# Patient Record
Sex: MALE | Race: OTHER | NOT HISPANIC OR LATINO | Employment: FULL TIME | URBAN - METROPOLITAN AREA
[De-identification: names, ages, dates, MRNs, and addresses within clinical notes are randomized per-mention and may not be internally consistent; named-entity substitution may affect disease eponyms.]

---

## 2017-04-13 ENCOUNTER — HOSPITAL ENCOUNTER (OUTPATIENT)
Dept: LAB | Facility: MEDICAL CENTER | Age: 62
End: 2017-04-13
Attending: NURSE PRACTITIONER
Payer: COMMERCIAL

## 2017-04-13 LAB
25(OH)D3 SERPL-MCNC: 37 NG/ML (ref 30–100)
ALBUMIN SERPL BCP-MCNC: 4.5 G/DL (ref 3.2–4.9)
ALBUMIN/GLOB SERPL: 1.7 G/DL
ALP SERPL-CCNC: 55 U/L (ref 30–99)
ALT SERPL-CCNC: 22 U/L (ref 2–50)
ANION GAP SERPL CALC-SCNC: 7 MMOL/L (ref 0–11.9)
APPEARANCE UR: CLEAR
AST SERPL-CCNC: 20 U/L (ref 12–45)
BILIRUB SERPL-MCNC: 1.1 MG/DL (ref 0.1–1.5)
BILIRUB UR QL STRIP.AUTO: NEGATIVE
BUN SERPL-MCNC: 16 MG/DL (ref 8–22)
CALCIUM SERPL-MCNC: 9.7 MG/DL (ref 8.5–10.5)
CHLORIDE SERPL-SCNC: 102 MMOL/L (ref 96–112)
CHOLEST SERPL-MCNC: 224 MG/DL (ref 100–199)
CO2 SERPL-SCNC: 30 MMOL/L (ref 20–33)
COLOR UR: YELLOW
CREAT SERPL-MCNC: 0.98 MG/DL (ref 0.5–1.4)
GFR SERPL CREATININE-BSD FRML MDRD: >60 ML/MIN/1.73 M 2
GLOBULIN SER CALC-MCNC: 2.6 G/DL (ref 1.9–3.5)
GLUCOSE SERPL-MCNC: 91 MG/DL (ref 65–99)
GLUCOSE UR STRIP.AUTO-MCNC: NEGATIVE MG/DL
HDLC SERPL-MCNC: 49 MG/DL
KETONES UR STRIP.AUTO-MCNC: NEGATIVE MG/DL
LDLC SERPL CALC-MCNC: 128 MG/DL
LEUKOCYTE ESTERASE UR QL STRIP.AUTO: NEGATIVE
MICRO URNS: NORMAL
NITRITE UR QL STRIP.AUTO: NEGATIVE
PH UR STRIP.AUTO: 6 [PH]
POTASSIUM SERPL-SCNC: 4.2 MMOL/L (ref 3.6–5.5)
PROT SERPL-MCNC: 7.1 G/DL (ref 6–8.2)
PROT UR QL STRIP: NEGATIVE MG/DL
PSA SERPL-MCNC: 0.95 NG/ML (ref 0–4)
RBC UR QL AUTO: NEGATIVE
SODIUM SERPL-SCNC: 139 MMOL/L (ref 135–145)
SP GR UR STRIP.AUTO: 1.02
TRIGL SERPL-MCNC: 235 MG/DL (ref 0–149)
TSH SERPL DL<=0.005 MIU/L-ACNC: 2.94 UIU/ML (ref 0.3–3.7)

## 2017-04-13 PROCEDURE — 80053 COMPREHEN METABOLIC PANEL: CPT

## 2017-04-13 PROCEDURE — 36415 COLL VENOUS BLD VENIPUNCTURE: CPT

## 2017-04-13 PROCEDURE — 81003 URINALYSIS AUTO W/O SCOPE: CPT

## 2017-04-13 PROCEDURE — 85025 COMPLETE CBC W/AUTO DIFF WBC: CPT

## 2017-04-13 PROCEDURE — 84153 ASSAY OF PSA TOTAL: CPT

## 2017-04-13 PROCEDURE — 82306 VITAMIN D 25 HYDROXY: CPT

## 2017-04-13 PROCEDURE — 84443 ASSAY THYROID STIM HORMONE: CPT

## 2017-04-13 PROCEDURE — 80061 LIPID PANEL: CPT

## 2017-04-18 ENCOUNTER — HOSPITAL ENCOUNTER (OUTPATIENT)
Dept: LAB | Facility: MEDICAL CENTER | Age: 62
End: 2017-04-18
Attending: NURSE PRACTITIONER
Payer: COMMERCIAL

## 2017-04-18 LAB
BASOPHILS # BLD AUTO: 0 % (ref 0–1.8)
BASOPHILS # BLD: 0 K/UL (ref 0–0.12)
EOSINOPHIL # BLD AUTO: 0.13 K/UL (ref 0–0.51)
EOSINOPHIL NFR BLD: 2.6 % (ref 0–6.9)
ERYTHROCYTE [DISTWIDTH] IN BLOOD BY AUTOMATED COUNT: 44.6 FL (ref 35.9–50)
HCT VFR BLD AUTO: 50 % (ref 42–52)
HGB BLD-MCNC: 16.8 G/DL (ref 14–18)
IMM GRANULOCYTES # BLD AUTO: 0.01 K/UL (ref 0–0.11)
IMM GRANULOCYTES NFR BLD AUTO: 0.2 % (ref 0–0.9)
LYMPHOCYTES # BLD AUTO: 1.98 K/UL (ref 1–4.8)
LYMPHOCYTES NFR BLD: 39.2 % (ref 22–41)
MCH RBC QN AUTO: 30.7 PG (ref 27–33)
MCHC RBC AUTO-ENTMCNC: 33.6 G/DL (ref 33.7–35.3)
MCV RBC AUTO: 91.2 FL (ref 81.4–97.8)
MONOCYTES # BLD AUTO: 0.45 K/UL (ref 0–0.85)
MONOCYTES NFR BLD AUTO: 8.9 % (ref 0–13.4)
NEUTROPHILS # BLD AUTO: 2.48 K/UL (ref 1.82–7.42)
NEUTROPHILS NFR BLD: 49.1 % (ref 44–72)
NRBC # BLD AUTO: 0 K/UL
NRBC BLD AUTO-RTO: 0 /100 WBC
PLATELET # BLD AUTO: 133 K/UL (ref 164–446)
PMV BLD AUTO: 12.7 FL (ref 9–12.9)
RBC # BLD AUTO: 5.48 M/UL (ref 4.7–6.1)
WBC # BLD AUTO: 5.1 K/UL (ref 4.8–10.8)

## 2017-04-18 PROCEDURE — 85025 COMPLETE CBC W/AUTO DIFF WBC: CPT

## 2018-01-16 ENCOUNTER — HOSPITAL ENCOUNTER (OUTPATIENT)
Dept: LAB | Facility: MEDICAL CENTER | Age: 63
End: 2018-01-16
Attending: FAMILY MEDICINE
Payer: COMMERCIAL

## 2018-01-16 LAB
ALBUMIN SERPL BCP-MCNC: 4.5 G/DL (ref 3.2–4.9)
ALBUMIN/GLOB SERPL: 1.7 G/DL
ALP SERPL-CCNC: 55 U/L (ref 30–99)
ALT SERPL-CCNC: 24 U/L (ref 2–50)
ANION GAP SERPL CALC-SCNC: 5 MMOL/L (ref 0–11.9)
AST SERPL-CCNC: 18 U/L (ref 12–45)
BASOPHILS # BLD AUTO: 0 % (ref 0–1.8)
BASOPHILS # BLD: 0 K/UL (ref 0–0.12)
BILIRUB SERPL-MCNC: 0.7 MG/DL (ref 0.1–1.5)
BUN SERPL-MCNC: 18 MG/DL (ref 8–22)
CALCIUM SERPL-MCNC: 9.3 MG/DL (ref 8.5–10.5)
CHLORIDE SERPL-SCNC: 109 MMOL/L (ref 96–112)
CHOLEST SERPL-MCNC: 204 MG/DL (ref 100–199)
CO2 SERPL-SCNC: 28 MMOL/L (ref 20–33)
CREAT SERPL-MCNC: 0.99 MG/DL (ref 0.5–1.4)
EOSINOPHIL # BLD AUTO: 0.18 K/UL (ref 0–0.51)
EOSINOPHIL NFR BLD: 4.1 % (ref 0–6.9)
ERYTHROCYTE [DISTWIDTH] IN BLOOD BY AUTOMATED COUNT: 44 FL (ref 35.9–50)
GLOBULIN SER CALC-MCNC: 2.6 G/DL (ref 1.9–3.5)
GLUCOSE SERPL-MCNC: 94 MG/DL (ref 65–99)
HCT VFR BLD AUTO: 50 % (ref 42–52)
HDLC SERPL-MCNC: 52 MG/DL
HGB BLD-MCNC: 16.9 G/DL (ref 14–18)
IMM GRANULOCYTES # BLD AUTO: 0.01 K/UL (ref 0–0.11)
IMM GRANULOCYTES NFR BLD AUTO: 0.2 % (ref 0–0.9)
LDLC SERPL CALC-MCNC: 120 MG/DL
LYMPHOCYTES # BLD AUTO: 1.57 K/UL (ref 1–4.8)
LYMPHOCYTES NFR BLD: 35.9 % (ref 22–41)
MCH RBC QN AUTO: 31.1 PG (ref 27–33)
MCHC RBC AUTO-ENTMCNC: 33.8 G/DL (ref 33.7–35.3)
MCV RBC AUTO: 92.1 FL (ref 81.4–97.8)
MONOCYTES # BLD AUTO: 0.39 K/UL (ref 0–0.85)
MONOCYTES NFR BLD AUTO: 8.9 % (ref 0–13.4)
NEUTROPHILS # BLD AUTO: 2.22 K/UL (ref 1.82–7.42)
NEUTROPHILS NFR BLD: 50.9 % (ref 44–72)
NRBC # BLD AUTO: 0 K/UL
NRBC BLD-RTO: 0 /100 WBC
PLATELET # BLD AUTO: 115 K/UL (ref 164–446)
PMV BLD AUTO: 12.7 FL (ref 9–12.9)
POTASSIUM SERPL-SCNC: 3.9 MMOL/L (ref 3.6–5.5)
PROT SERPL-MCNC: 7.1 G/DL (ref 6–8.2)
RBC # BLD AUTO: 5.43 M/UL (ref 4.7–6.1)
SODIUM SERPL-SCNC: 142 MMOL/L (ref 135–145)
TRIGL SERPL-MCNC: 160 MG/DL (ref 0–149)
WBC # BLD AUTO: 4.4 K/UL (ref 4.8–10.8)

## 2018-01-16 PROCEDURE — 85025 COMPLETE CBC W/AUTO DIFF WBC: CPT

## 2018-01-16 PROCEDURE — 36415 COLL VENOUS BLD VENIPUNCTURE: CPT

## 2018-01-16 PROCEDURE — 80061 LIPID PANEL: CPT

## 2018-01-16 PROCEDURE — 80053 COMPREHEN METABOLIC PANEL: CPT

## 2018-01-16 PROCEDURE — 84153 ASSAY OF PSA TOTAL: CPT

## 2018-01-16 PROCEDURE — 84443 ASSAY THYROID STIM HORMONE: CPT

## 2018-01-16 PROCEDURE — 82306 VITAMIN D 25 HYDROXY: CPT

## 2018-01-17 LAB
25(OH)D3 SERPL-MCNC: 33 NG/ML (ref 30–100)
PSA SERPL-MCNC: 1.21 NG/ML (ref 0–4)
TSH SERPL DL<=0.005 MIU/L-ACNC: 2.51 UIU/ML (ref 0.38–5.33)

## 2018-01-22 ENCOUNTER — HOSPITAL ENCOUNTER (OUTPATIENT)
Dept: LAB | Facility: MEDICAL CENTER | Age: 63
End: 2018-01-22
Attending: FAMILY MEDICINE
Payer: COMMERCIAL

## 2018-01-22 LAB — ABO GROUP BLD: NORMAL

## 2018-01-22 PROCEDURE — 86900 BLOOD TYPING SEROLOGIC ABO: CPT

## 2018-01-22 PROCEDURE — 36415 COLL VENOUS BLD VENIPUNCTURE: CPT

## 2018-04-29 ENCOUNTER — OFFICE VISIT (OUTPATIENT)
Dept: URGENT CARE | Facility: CLINIC | Age: 63
End: 2018-04-29
Payer: COMMERCIAL

## 2018-04-29 VITALS
WEIGHT: 142 LBS | HEIGHT: 66 IN | TEMPERATURE: 98.1 F | SYSTOLIC BLOOD PRESSURE: 126 MMHG | DIASTOLIC BLOOD PRESSURE: 82 MMHG | BODY MASS INDEX: 22.82 KG/M2 | HEART RATE: 79 BPM | RESPIRATION RATE: 16 BRPM | OXYGEN SATURATION: 96 %

## 2018-04-29 DIAGNOSIS — J00 NASOPHARYNGITIS: ICD-10-CM

## 2018-04-29 LAB
INT CON NEG: NORMAL
INT CON POS: NORMAL
S PYO AG THROAT QL: NEGATIVE

## 2018-04-29 PROCEDURE — 99214 OFFICE O/P EST MOD 30 MIN: CPT | Performed by: PHYSICIAN ASSISTANT

## 2018-04-29 PROCEDURE — 87880 STREP A ASSAY W/OPTIC: CPT | Performed by: PHYSICIAN ASSISTANT

## 2018-04-29 RX ORDER — AZITHROMYCIN 250 MG/1
TABLET, FILM COATED ORAL
Qty: 6 TAB | Refills: 1 | Status: SHIPPED | OUTPATIENT
Start: 2018-04-29 | End: 2019-04-22

## 2018-04-29 RX ORDER — AZITHROMYCIN 250 MG/1
TABLET, FILM COATED ORAL
Qty: 6 TAB | Refills: 1 | Status: SHIPPED | OUTPATIENT
Start: 2018-04-29 | End: 2018-04-29

## 2018-04-29 ASSESSMENT — ENCOUNTER SYMPTOMS
EYES NEGATIVE: 1
RESPIRATORY NEGATIVE: 1
CARDIOVASCULAR NEGATIVE: 1
SORE THROAT: 1
TROUBLE SWALLOWING: 1
CONSTITUTIONAL NEGATIVE: 1
SWOLLEN GLANDS: 1

## 2018-04-29 NOTE — PROGRESS NOTES
"Subjective:      Edgar Swanson is a 63 y.o. male who presents with Nasal Congestion; Pharyngitis; and Sinus Problem            Pharyngitis    This is a new (st, sinus kim) problem. The problem has been unchanged. Neither side of throat is experiencing more pain than the other. There has been no fever. The pain is moderate. Associated symptoms include congestion, swollen glands and trouble swallowing. He has had no exposure to strep. He has tried nothing for the symptoms. The treatment provided no relief.   Sinus Problem   Associated symptoms include congestion, a sore throat and swollen glands.       Review of Systems   Constitutional: Negative.    HENT: Positive for congestion, sore throat and trouble swallowing.    Eyes: Negative.    Respiratory: Negative.    Cardiovascular: Negative.    Skin: Negative.           Objective:     /82   Pulse 79   Temp 36.7 °C (98.1 °F)   Resp 16   Ht 1.676 m (5' 6\")   Wt 64.4 kg (142 lb)   SpO2 96%   BMI 22.92 kg/m²      Physical Exam   Constitutional: He is oriented to person, place, and time. He appears well-developed and well-nourished. No distress.   HENT:   Head: Normocephalic and atraumatic.   +maxill.sinus press.  +phar./tons redn     Eyes: EOM are normal. Pupils are equal, round, and reactive to light.   Neck: Normal range of motion. Neck supple.   Cardiovascular: Normal rate.    Pulmonary/Chest: Effort normal and breath sounds normal. No respiratory distress.   Lymphadenopathy:     He has cervical adenopathy.   Neurological: He is alert and oriented to person, place, and time.   Skin: Skin is warm and dry.   Psychiatric: He has a normal mood and affect. His behavior is normal.   Nursing note and vitals reviewed.    Active Ambulatory Problems     Diagnosis Date Noted   • Hyperlipidemia 03/01/2010   • Anxiety 04/03/2013   • Chronic urticaria 01/19/2014   • Appendicitis with peritoneal abscess 03/17/2015   • Peritonitis (HCC) 03/18/2015   • Hypophosphatemia " 03/19/2015   • Vitiligo 05/05/2015     Resolved Ambulatory Problems     Diagnosis Date Noted   • Retinitis pigmentosa 03/01/2010     Past Medical History:   Diagnosis Date   • Chronic urticaria 1/19/2014   • Retinitis pigmentosa 3/1/2010     Current Outpatient Prescriptions on File Prior to Visit   Medication Sig Dispense Refill   • oxycodone immediate-release (ROXICODONE) 5 MG TABS Take 1 Tab by mouth every 3 hours as needed (Moderate Pain, CPOT 3-5). 60 Tab 0   • Ibuprofen (ADVIL PO) Take  by mouth.     • Cetirizine HCl (ZYRTEC PO) Take  by mouth.       No current facility-administered medications on file prior to visit.      Social History     Social History   • Marital status:      Spouse name: N/A   • Number of children: N/A   • Years of education: N/A     Occupational History   • Not on file.     Social History Main Topics   • Smoking status: Never Smoker   • Smokeless tobacco: Never Used   • Alcohol use 1.0 oz/week     2 Standard drinks or equivalent per week      Comment: 2 glasses wine   • Drug use: No   • Sexual activity: Not on file      Comment: , 1 child, professer economics [employer]     Other Topics Concern   • Not on file     Social History Narrative    , lives with wife.     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         rst ng     Assessment/Plan:      NASOPHARYNGITIS, cough    · Start w/MucinexD; nsaids; [hold rx for abx prn [conditional use] if sx persist/worsen]  ·

## 2019-03-05 ENCOUNTER — TELEPHONE (OUTPATIENT)
Dept: SCHEDULING | Facility: IMAGING CENTER | Age: 64
End: 2019-03-05

## 2019-04-22 ENCOUNTER — OFFICE VISIT (OUTPATIENT)
Dept: INTERNAL MEDICINE | Facility: MEDICAL CENTER | Age: 64
End: 2019-04-22
Payer: COMMERCIAL

## 2019-04-22 VITALS
WEIGHT: 140.6 LBS | BODY MASS INDEX: 22.6 KG/M2 | DIASTOLIC BLOOD PRESSURE: 87 MMHG | HEART RATE: 78 BPM | HEIGHT: 66 IN | TEMPERATURE: 98.4 F | OXYGEN SATURATION: 95 % | SYSTOLIC BLOOD PRESSURE: 144 MMHG

## 2019-04-22 DIAGNOSIS — R21 RASH AND NONSPECIFIC SKIN ERUPTION: ICD-10-CM

## 2019-04-22 DIAGNOSIS — R03.0 ELEVATED BLOOD PRESSURE READING: ICD-10-CM

## 2019-04-22 DIAGNOSIS — L80 VITILIGO: ICD-10-CM

## 2019-04-22 DIAGNOSIS — Z00.00 HEALTHCARE MAINTENANCE: ICD-10-CM

## 2019-04-22 DIAGNOSIS — E78.5 HYPERLIPIDEMIA, UNSPECIFIED HYPERLIPIDEMIA TYPE: ICD-10-CM

## 2019-04-22 PROCEDURE — 99204 OFFICE O/P NEW MOD 45 MIN: CPT | Mod: GC | Performed by: INTERNAL MEDICINE

## 2019-04-22 RX ORDER — ASPIRIN 325 MG
81 TABLET ORAL DAILY
COMMUNITY
End: 2023-12-15

## 2019-04-22 RX ORDER — CHLORAL HYDRATE 500 MG
1000 CAPSULE ORAL
COMMUNITY
End: 2023-12-15

## 2019-04-22 ASSESSMENT — PATIENT HEALTH QUESTIONNAIRE - PHQ9: CLINICAL INTERPRETATION OF PHQ2 SCORE: 0

## 2019-04-22 NOTE — PATIENT INSTRUCTIONS
I recommend the Shingrix vaccine series to prevent shingles.  Please get the vaccines at a local pharmacy

## 2019-04-22 NOTE — PROGRESS NOTES
New Patient to Establish    Reason to establish: New patient to establish    CC: establish care and follow up on HLD, Vitiligo, allergies    HPI:   Mr Swanson is a 65y/o male with PMHx of Vitiligo, HLD, and allergies that came to clinic to establish care.     Vitiligo   Skin rash   Patient has chronic history of vitiligo and he has been seen by Dermatology in the past   Patient also has history of recurrent rash that he described as red spots that are itching. At the time of evaluation he did not had any active rash. He usually takes zyrtec and it improved.  Patient would like to reestablish with a dermatologist.     Hyperlipidemia  Patient has history of high cholesterol, currently only taking omega-3.  Patient was taking statins in the past but he quit, because he wanted a more natural way to decrease his cholesterol. Previous labs done in January 2018 showed cholesterol of 204, triglycerides of 160, HDL 52, and LDL of 120.  His ASCVD score is 14.2%.  Oriented about the risk of not treating high cholesterol, but patient will not like to start statin at this time.     Elevated blood pressure reading  His blood pressure in the clinic was 144/87.  Patient denies any history of hypertension and is currently not taking any medications.  Denies any chest pain, palpitation, headache, vision changes.     Healthcare maintenance  Colonoscopy was done in 2019 and was normal according to patient  Recommend shingles vaccine  Tdap- due in 2025  PSA-after oriented patient about benefits and disadvantages of measuring this level patient agree to have it tested.     Patient Active Problem List    Diagnosis Date Noted   • Peritonitis (HCC) 03/18/2015     Priority: High   • Appendicitis with peritoneal abscess 03/17/2015     Priority: Medium   • Chronic urticaria 01/19/2014     Priority: Medium   • Anxiety 04/03/2013     Priority: Medium   • Hyperlipidemia 03/01/2010     Priority: Medium   • Vitiligo 05/05/2015     Priority: Low   •  Hypophosphatemia 03/19/2015     Priority: Low   • Healthcare maintenance 04/22/2019       Past Medical History:   Diagnosis Date   • Chronic urticaria 1/19/2014   • Hyperlipidemia    • Retinitis pigmentosa 3/1/2010       Current Outpatient Prescriptions   Medication Sig Dispense Refill   • VITAMIN D, CHOLECALCIFEROL, PO Take  by mouth.     • aspirin (ASA) 325 MG Tab Take 325 mg by mouth every 6 hours as needed.     • Omega-3 Fatty Acids (FISH OIL) 1000 MG Cap capsule Take 1,000 mg by mouth 3 times a day, with meals.     • Multiple Vitamins-Minerals (EYE VITAMINS PO) Take  by mouth.     • Cetirizine HCl (ZYRTEC PO) Take  by mouth.     • Ibuprofen (ADVIL PO) Take  by mouth.       No current facility-administered medications for this visit.        Allergies as of 04/22/2019   • (No Known Allergies)       Social History     Social History   • Marital status:      Spouse name: N/A   • Number of children: N/A   • Years of education: N/A     Occupational History   • Not on file.     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     Children: 1 daughter    Work: professor in economics, vice-michaelle, [employer]       Family History   Problem Relation Age of Onset   • Heart Disease Father    • Hyperlipidemia Father        Past Surgical History:   Procedure Laterality Date   • EXPLORATORY LAPAROTOMY  3/17/2015    Performed by Frank Mayer D.O. at SURGERY [facility]   • HEMICOLECTOMY  3/17/2015    Performed by Frank Mayer D.O. at SURGERY [facility]   • ABDOMINAL EXPLORATION     • APPENDECTOMY         ROS: As per HPI. Additional pertinent symptoms as noted  "below.    Constitutional: Denies fevers.  Eyes: Denies changes in vision, no eye pain  Ears/Nose/Throat/Mouth: Denies nasal congestion or sore throat   Cardiovascular: Denies chest pain or palpitations   Respiratory: Denies shortness of breath , Denies cough  Gastrointestinal/Hepatic: Denies abdominal pain, nausea, vomiting, or diarrhea   Genitourinary: Denies bladder dysfunction, dysuria or frequency  Musculoskeletal/Rheum: No complaints   Skin/Breast: Positive for rash  Neurological: Denies headache. Denied focal weakness/parasthesias  Psychiatric: No complaints  All other systems were reviewed and are negative (AMA/CMS criteria)      /87 (BP Location: Left arm, Patient Position: Sitting)   Pulse 78   Temp 36.9 °C (98.4 °F) (Temporal)   Ht 1.676 m (5' 6\")   Wt 63.8 kg (140 lb 9.6 oz)   SpO2 95%   BMI 22.69 kg/m²     Physical Exam  General:  Alert and oriented, No apparent distress.  Eyes: Pupils equal and reactive. No scleral icterus.  Throat: Clear no erythema or exudates noted.  Neck: Supple.  Lungs: Clear to auscultation.  No wheezing or crackles  Cardiovascular: Regular rate and rhythm. No murmurs, rubs or gallops.  Abdomen: Soft, nondistended, nontender, positive bowel sounds  Extremities: No clubbing, cyanosis, edema.  Normal range of motion  Neuro: Alert and oriented.  Cranial nerves grossly intact, no focal neurologic deficit  Skin: No rash or suspicious skin lesions noted.  Psych: mood normal     Note: I have reviewed all pertinent labs and diagnostic tests associated with this visit with specific comments listed under the assessment and plan below    Assessment and Plan    Problem List Items Addressed This Visit     Hyperlipidemia  Patient has history of high cholesterol, currently taking omega-3  Last labs were done in January 2018 and showed cholesterol of 204, triglycerides of 160, HDL of 52, .  Patient current ASCVD score is 14%.   Oriented about the risk of not starting a statin " at this point.  Patient will like to avoid starting medication.   Oriented about diet and exercise  We will send new labs to evaluate cholesterol levels    Relevant Orders    Lipid Profile    Healthcare maintenance  Colonoscopy was done in 2019 and was normal according to patient  Recommend shingles vaccine  Tdap- due in 2025  PSA-after oriented patient about benefits and disadvantages of measuring this level patient agree to have it tested.     Relevant Orders    CBC WITH DIFFERENTIAL    Comp Metabolic Panel    Lipid Profile    VITAMIN D,25 HYDROXY    TSH WITH REFLEX TO FT4    PROSTATE SPECIFIC AG SCREENING    REFERRAL TO DERMATOLOGY    VITAMIN B12    Vitiligo  Rash and nonspecific skin eruption    Patient has history of vitiligo and was seen by dermatology in the past.   He reported a history of rash that comes and goes.  It is described maculopapular rash that sometimes is not itching.  He uses Zyrtec with improvement in symptoms.   Patient would like to reestablish with dermatology    Relevant Orders    REFERRAL TO DERMATOLOGY    Elevated blood pressure reading      Patient blood pressure at the clinic was 144/87  He denies any history of hypertension and is not taking any medications  Patient will return to clinic to measure his blood pressure again.  Depending on level will recommend start medication.             Followup: Return in about 5 weeks (around 5/27/2019).    Risk Assessment (discuss potential complications a function of chronic problems): Yes     Complexity (discuss number of co-morbidities): Yes     Signed by: Mo Aaron M.D.

## 2019-12-02 ENCOUNTER — OFFICE VISIT (OUTPATIENT)
Dept: MEDICAL GROUP | Facility: MEDICAL CENTER | Age: 64
End: 2019-12-02
Payer: COMMERCIAL

## 2019-12-02 VITALS
SYSTOLIC BLOOD PRESSURE: 118 MMHG | HEIGHT: 66 IN | RESPIRATION RATE: 14 BRPM | TEMPERATURE: 98.5 F | BODY MASS INDEX: 23.38 KG/M2 | DIASTOLIC BLOOD PRESSURE: 64 MMHG | WEIGHT: 145.5 LBS | OXYGEN SATURATION: 97 % | HEART RATE: 83 BPM

## 2019-12-02 DIAGNOSIS — Z00.00 ANNUAL PHYSICAL EXAM: ICD-10-CM

## 2019-12-02 DIAGNOSIS — Z11.59 NEED FOR HEPATITIS C SCREENING TEST: ICD-10-CM

## 2019-12-02 DIAGNOSIS — Z91.09 ENVIRONMENTAL ALLERGIES: ICD-10-CM

## 2019-12-02 DIAGNOSIS — E55.9 HYPOVITAMINOSIS D: ICD-10-CM

## 2019-12-02 DIAGNOSIS — Z00.00 HEALTHCARE MAINTENANCE: ICD-10-CM

## 2019-12-02 DIAGNOSIS — D72.819 LEUKOPENIA, UNSPECIFIED TYPE: ICD-10-CM

## 2019-12-02 DIAGNOSIS — C44.310 FACIAL BASAL CELL CANCER: ICD-10-CM

## 2019-12-02 DIAGNOSIS — E78.5 DYSLIPIDEMIA: ICD-10-CM

## 2019-12-02 DIAGNOSIS — D69.6 THROMBOCYTOPENIA (HCC): ICD-10-CM

## 2019-12-02 DIAGNOSIS — L80 VITILIGO: ICD-10-CM

## 2019-12-02 DIAGNOSIS — Z12.5 SCREENING FOR MALIGNANT NEOPLASM OF PROSTATE: ICD-10-CM

## 2019-12-02 DIAGNOSIS — N52.9 ERECTILE DYSFUNCTION, UNSPECIFIED ERECTILE DYSFUNCTION TYPE: ICD-10-CM

## 2019-12-02 PROCEDURE — 99386 PREV VISIT NEW AGE 40-64: CPT | Performed by: INTERNAL MEDICINE

## 2019-12-02 PROCEDURE — 99204 OFFICE O/P NEW MOD 45 MIN: CPT | Mod: 25 | Performed by: INTERNAL MEDICINE

## 2019-12-02 RX ORDER — SILDENAFIL 50 MG/1
50 TABLET, FILM COATED ORAL PRN
Qty: 10 TAB | Refills: 3 | Status: SHIPPED | OUTPATIENT
Start: 2019-12-02 | End: 2023-12-15

## 2019-12-02 SDOH — HEALTH STABILITY: MENTAL HEALTH: HOW OFTEN DO YOU HAVE A DRINK CONTAINING ALCOHOL?: 2-3 TIMES A WEEK

## 2019-12-02 SDOH — HEALTH STABILITY: MENTAL HEALTH: HOW MANY STANDARD DRINKS CONTAINING ALCOHOL DO YOU HAVE ON A TYPICAL DAY?: 1 OR 2

## 2019-12-02 SDOH — HEALTH STABILITY: PHYSICAL HEALTH: ON AVERAGE, HOW MANY MINUTES DO YOU ENGAGE IN EXERCISE AT THIS LEVEL?: 0 MIN

## 2019-12-02 SDOH — HEALTH STABILITY: PHYSICAL HEALTH: ON AVERAGE, HOW MANY DAYS PER WEEK DO YOU ENGAGE IN MODERATE TO STRENUOUS EXERCISE (LIKE A BRISK WALK)?: 0 DAYS

## 2019-12-02 NOTE — PROGRESS NOTES
CHIEF COMPLIANT  Edgar Swanson is a 64 y.o. male who presents for a annual exam    HCM  Recommendations/advised:  Regular exercise at least 4 days a week  Diet: advised balanced   Dental exam at least 1-2 times per year  Self testicle exam  Sunscreen use.    PSA: 1.21, 1/2018  Colonoscopy/colon cancer screen: done within 2-3 yrs, pending records    Immunizations:  TdaP: 5/2015  Shingless vaccine: Advised  Influenza: 10/2019    Dyslipidemia  The patient had slightly abnormal lipid panel, no medications  Diet: Healthy  Exercise: At least 3 days in a week  BMI: 23  FH: Father     Hypovitaminosis D  The patient had low vitamin D level at 33.  Vitamin D supplement: OTC multivitamins.    Vitiligo  The patient has had vitiligo on upper extremities, head, gradually worsening.  He has been followed up by dermatology.    Basal cell cancer  Location: Left temple.  Pending excision by dermatology this month.  He has been followed up by dermatology.    Environmental allergies  The patient has had allergies throughout the year, with nasal congestion and sneezing.  No current.  He used OTC antihistamine, that helped.  Family history: Unknown.    Leukopenia/thrombocytopenia  The patient has had slightly lobar WBC and PLT count.  Denies cough  -Frequent infections, lymphadenopathy, anorexia, weight loss.  -Easy bleeding or bruising.    ED  The patient has had difficulty to get and sustain erection.  He requested medication.    PAST MEDICAL HISTORY  Past Medical History:   Diagnosis Date   • Allergy    • Basal cell carcinoma 2019   • Chronic urticaria 1/19/2014   • Environmental and seasonal allergies 2012   • Facial basal cell cancer    • Hyperlipidemia    • Retinitis pigmentosa 3/1/2010     FAMILY HISTORY  Family History   Problem Relation Age of Onset   • Heart Disease Father    • Hyperlipidemia Father      SOCIAL HISTORY  Social History     Tobacco Use   • Smoking status: Never Smoker   • Smokeless tobacco: Never Used    Substance Use Topics   • Alcohol use: Yes     Alcohol/week: 4.2 oz     Types: 5 Glasses of wine, 2 Standard drinks or equivalent per week     Frequency: 2-3 times a week     Drinks per session: 1 or 2   • Drug use: No     Social History     Patient does not qualify to have social determinant information on file (likely too young).   Social History Narrative    , lives with wife.     Children: 1 daughter    Work: professor in economics, vice-michaelle, [employer]     SURGICAL HISTORY  Past Surgical History:   Procedure Laterality Date   • EXPLORATORY LAPAROTOMY  3/17/2015    Performed by Frank Mayer D.O. at SURGERY [facility]   • HEMICOLECTOMY  3/17/2015    Performed by Frank Mayer D.O. at SURGERY [facility]   • ABDOMINAL EXPLORATION     • APPENDECTOMY       CURRENT MEDICATIONS  Current Outpatient Medications   Medication Sig Dispense Refill   • sildenafil citrate (VIAGRA) 50 MG tablet Take 1 Tab by mouth as needed for Erectile Dysfunction. 10 Tab 3   • VITAMIN D, CHOLECALCIFEROL, PO Take  by mouth.     • aspirin (ASA) 325 MG Tab Take 325 mg by mouth every 6 hours as needed.     • Omega-3 Fatty Acids (FISH OIL) 1000 MG Cap capsule Take 1,000 mg by mouth 3 times a day, with meals.     • Multiple Vitamins-Minerals (EYE VITAMINS PO) Take  by mouth.     • Ibuprofen (ADVIL PO) Take  by mouth.     • Cetirizine HCl (ZYRTEC PO) Take  by mouth.       No current facility-administered medications for this visit.      ALLERGIES  No Known Allergies  REVIEW OF SYSTEMS  Constitutional: Denies fever, chills, anorexia, weight loss.  Skin: negative for rash, and as above.  Eyes: negative for visual blurring, eye discharge  Ears/Nose/Throat: negative for tinnitus, vertigo  Respiratory: negative for persistent cough, SOB, wheezing  CVS: negative for palpitations, tachycardia, irregular heart beat, chest pain, exertional dyspnea, or peripheral edema.  GI: negative for poor appetite, nausea, heartburn / reflux, abdominal  "pain, change in BM habits  : negative for nocturia, dysuria, frequency, incontinence.  And as above.  MS: negative for significant back, joint, muscle pain.  Neuro: negative for headaches, numbness, weakness  Psych: negative for anxiety, depression, sleep disturbance  Hematologic/Lymphatic/Immunologic: negative for swollen glands, and as above.  Endocrine: negative for temperature intolerance, polydipsia, polyuria, unintentional weight changes.     PHYSICAL EXAMINATION  Blood Pressure 118/64   Pulse 83   Temperature 36.9 °C (98.5 °F) (Temporal)   Respiration 14   Height 1.676 m (5' 6\")   Weight 66 kg (145 lb 8.1 oz)   Oxygen Saturation 97%   Body mass index is 23.48 kg/m².  Wt Readings from Last 4 Encounters:   12/02/19 66 kg (145 lb 8.1 oz)   04/22/19 63.8 kg (140 lb 9.6 oz)   04/29/18 64.4 kg (142 lb)   05/05/15 66.7 kg (147 lb)     General appearance:healthy, well developed, well nourished, well-groomed  Psych: alert, no distress, cooperative. Eye contact good, speech goal directed. Affect calm.   Eyes: conjunctivae and sclerae normal, lids and lashes normal, EOMI, PERRLA.   Ears: external ears normal to inspection, canals clear.TMs pearly gray with normal light reflex and anatomic landmarks.  Nose: nose shows no deformity, asymmetry. Nasal mucosa is normal.   Oropharynx: lips normal without lesions; buccal mucosa normal; gums healthy;  teeth intact, non-carious; palate normal; tongue midline and normal  Neck: no asymmetry, masses, adenopathy. Thyroid normal to palpation. Carotids without bruits, no jugular venous distention.  Lungs: clear to auscultation with good excursion. Normal respiratory rate. Chest symmetric no chest wall tenderness.  CVS: Regular rate and rhythm, no murmur.  No peripheral edema  Abdomen: No CVAT. Abdomen soft, non-tender, no masses palpable. No HSM or palpable renal abnormalities. Bowel sounds normal, no bruits heard.  MS:  Strength grossly normal in UE and LE. No deformities " present  Lymphatic: None significantly enlarged: supraclavicular  Skin: color normal, with 0.5 cm lesion on the left temple consistent with basal cell cancer.  Vitiligo on the head and upper extremities.  Neuro: Speech was normal.   CN 2-12 intact.  Muscle strength is 5/5, symmetric throughout.  DTR are 2/4, symmetric throughout.  Sensation to soft touch is intact, symmetric throughout.  Gait is normal. No tremor.     LABS  Labs are reviewed and discussed with a patient  Lab Results   Component Value Date/Time    CHOLSTRLTOT 204 (H) 01/16/2018 09:35 AM     (H) 01/16/2018 09:35 AM    HDL 52 01/16/2018 09:35 AM    TRIGLYCERIDE 160 (H) 01/16/2018 09:35 AM       Lab Results   Component Value Date/Time    SODIUM 142 01/16/2018 09:35 AM    POTASSIUM 3.9 01/16/2018 09:35 AM    CHLORIDE 109 01/16/2018 09:35 AM    CO2 28 01/16/2018 09:35 AM    GLUCOSE 94 01/16/2018 09:35 AM    BUN 18 01/16/2018 09:35 AM    CREATININE 0.99 01/16/2018 09:35 AM    CREATININE 0.97 11/29/2011 10:00 AM    BUNCREATRAT 19 11/29/2011 10:00 AM    GLOMRATE >59 08/26/2010 09:25 AM     Lab Results   Component Value Date/Time    ALKPHOSPHAT 55 01/16/2018 09:35 AM    ASTSGOT 18 01/16/2018 09:35 AM    ALTSGPT 24 01/16/2018 09:35 AM    TBILIRUBIN 0.7 01/16/2018 09:35 AM      Lab Results   Component Value Date/Time    TSH 2.980 11/29/2011 10:00 AM    TSH 2.860 04/29/2010 09:10 AM     Lab Results   Component Value Date/Time    WBC 4.4 (L) 01/16/2018 09:35 AM    WBC 4.6 11/29/2011 10:00 AM    RBC 5.43 01/16/2018 09:35 AM    RBC 5.64 (H) 11/29/2011 10:00 AM    HEMOGLOBIN 16.9 01/16/2018 09:35 AM    HEMATOCRIT 50.0 01/16/2018 09:35 AM    MCV 92.1 01/16/2018 09:35 AM    MCV 90 11/29/2011 10:00 AM    MCH 31.1 01/16/2018 09:35 AM    MCH 31.0 11/29/2011 10:00 AM    MCHC 33.8 01/16/2018 09:35 AM    MPV 12.7 01/16/2018 09:35 AM    NEUTSPOLYS 50.90 01/16/2018 09:35 AM    LYMPHOCYTES 35.90 01/16/2018 09:35 AM    MONOCYTES 8.90 01/16/2018 09:35 AM    EOSINOPHILS  4.10 01/16/2018 09:35 AM    BASOPHILS 0.00 01/16/2018 09:35 AM      ASSESSMENT/PLAN    1. Annual physical exam  Reviewed PMH, PSH, FH, SH, ALL, MEDS, HCM/IMM.   Advised healthy habits, diet, exercise.  Release form for old records: signed    2. Healthcare maintenance  Per HPI    3. Need for hepatitis C screening test  - HEP C VIRUS ANTIBODY; Future    4. Screening for malignant neoplasm of prostate  - PROSTATE SPECIFIC AG SCREENING; Future    5. Dyslipidemia  Discussed about treatment options, patient declined prescription medication.  Advised low calorie diet, daily exercise, weight control  - Comp Metabolic Panel; Future  - Lipid Profile; Future  - Comp Metabolic Panel; Future  - Lipid Profile; Future    6. Hypovitaminosis D  Advised 2000 units of vitamin D daily, OTC  - VITAMIN D,25 HYDROXY; Future  - VITAMIN D,25 HYDROXY; Future    7. Vitiligo  Continue dermatology follow-up    8. Facial basal cell cancer  Pending excision this month, continue dermatology follow-up    9.  Leukopenia  - CBC WITH DIFFERENTIAL; Future  10 Thrombocytopenia (HCC)  - CBC WITH DIFFERENTIAL; Future  Stable/mild, follow-up labs    11.  Environmental allergies  No current symptoms, continue OTC antihistamine as needed    12. Erectile dysfunction, unspecified erectile dysfunction type  Discussed treatment options, trial:  - sildenafil citrate (VIAGRA) 50 MG tablet; Take 1 Tab by mouth as needed for Erectile Dysfunction.  Dispense: 10 Tab; Refill: 3    Smoking Counseling: Nonsmoker    Next office visit is due in  1 year and prn    All questions are answered.    Please note that this dictation was created using voice recognition software. Despite all efforts, some minor grammar mistakes are possible.  '

## 2020-07-30 ENCOUNTER — OFFICE VISIT (OUTPATIENT)
Dept: MEDICAL GROUP | Facility: PHYSICIAN GROUP | Age: 65
End: 2020-07-30
Payer: COMMERCIAL

## 2020-07-30 VITALS
WEIGHT: 138.4 LBS | DIASTOLIC BLOOD PRESSURE: 80 MMHG | BODY MASS INDEX: 22.24 KG/M2 | RESPIRATION RATE: 16 BRPM | HEART RATE: 80 BPM | SYSTOLIC BLOOD PRESSURE: 118 MMHG | OXYGEN SATURATION: 95 % | HEIGHT: 66 IN | TEMPERATURE: 97.9 F

## 2020-07-30 DIAGNOSIS — Z91.09 ENVIRONMENTAL ALLERGIES: ICD-10-CM

## 2020-07-30 DIAGNOSIS — Z00.00 WELL ADULT EXAM: ICD-10-CM

## 2020-07-30 DIAGNOSIS — Z12.5 SCREENING FOR MALIGNANT NEOPLASM OF PROSTATE: ICD-10-CM

## 2020-07-30 DIAGNOSIS — D69.6 THROMBOCYTOPENIA (HCC): ICD-10-CM

## 2020-07-30 DIAGNOSIS — E78.5 DYSLIPIDEMIA: ICD-10-CM

## 2020-07-30 PROCEDURE — 99214 OFFICE O/P EST MOD 30 MIN: CPT | Performed by: FAMILY MEDICINE

## 2020-07-30 ASSESSMENT — PATIENT HEALTH QUESTIONNAIRE - PHQ9: CLINICAL INTERPRETATION OF PHQ2 SCORE: 0

## 2020-07-30 NOTE — ASSESSMENT & PLAN NOTE
This is a chronic issue  The patient has a hx of this  This is lifestyle controlled  Not on statin

## 2020-07-30 NOTE — ASSESSMENT & PLAN NOTE
This is a chronic problem  The patient is noted to have low plt count last checked 2 years ago at 115  Denies any major bleeding or clotting issues   He is not on any medications that could alter plt count

## 2020-07-30 NOTE — PROGRESS NOTES
Subjective:     Chief Complaint   Patient presents with   • Establish Care   • Requesting Labs       HPI:   Edgar presents today to discuss the following.  Prior PCP: Dr Puentes    Thrombocytopenia ([facility])  This is a chronic problem  The patient is noted to have low plt count last checked 2 years ago at 115  Denies any major bleeding or clotting issues   He is not on any medications that could alter plt count    Environmental allergies  This is a chronic issue  The patient has a hx of allergies  He gets body urticaria at times  He takes cetrizine as needed     Dyslipidemia  This is a chronic issue  The patient has a hx of this  This is lifestyle controlled  Not on statin      Past Medical History:   Diagnosis Date   • Allergy    • Basal cell carcinoma 2019   • Chronic urticaria 1/19/2014   • Environmental and seasonal allergies 2012   • Facial basal cell cancer    • Hyperlipidemia    • Retinitis pigmentosa 3/1/2010       Social History     Tobacco Use   • Smoking status: Never Smoker   • Smokeless tobacco: Never Used   Substance Use Topics   • Alcohol use: Yes     Alcohol/week: 4.2 oz     Types: 5 Glasses of wine, 2 Standard drinks or equivalent per week     Frequency: 2-3 times a week     Drinks per session: 1 or 2   • Drug use: No       Current Outpatient Medications Ordered in Epic   Medication Sig Dispense Refill   • sildenafil citrate (VIAGRA) 50 MG tablet Take 1 Tab by mouth as needed for Erectile Dysfunction. 10 Tab 3   • VITAMIN D, CHOLECALCIFEROL, PO Take  by mouth.     • aspirin (ASA) 325 MG Tab Take 81 mg by mouth every day.     • Omega-3 Fatty Acids (FISH OIL) 1000 MG Cap capsule Take 1,000 mg by mouth 3 times a day, with meals.     • Multiple Vitamins-Minerals (EYE VITAMINS PO) Take  by mouth.     • Ibuprofen (ADVIL PO) Take  by mouth.     • Cetirizine HCl (ZYRTEC PO) Take  by mouth.       No current Epic-ordered facility-administered medications on file.        Allergies:  Patient has no known  "allergies.    Health Maintenance: Completed    ROS:  Gen: no fevers/chills, no changes in weight  Eyes: no changes in vision  ENT: no sore throat, no hearing loss, no bloody nose  Pulm: no sob, no cough  CV: no chest pain, no palpitations      Objective:     Exam:  /80 (BP Location: Left arm, Patient Position: Sitting, BP Cuff Size: Adult)   Pulse 80   Temp 36.6 °C (97.9 °F) (Temporal)   Resp 16   Ht 1.676 m (5' 6\")   Wt 62.8 kg (138 lb 6.4 oz)   SpO2 95%   BMI 22.34 kg/m²  Body mass index is 22.34 kg/m².    Constitutional: Alert, no distress, well-groomed.  Skin: Warm, dry, good turgor, no rashes in visible areas.  Eye: Equal, round and reactive, conjunctiva clear, lids normal.  ENMT: Lips without lesions, good dentition, moist mucous membranes.  Neck: Trachea midline, no masses, no thyromegaly.  Respiratory: Unlabored respiratory effort, no cough.  MSK: Normal gait, moves all extremities.  Neuro: Grossly non-focal.   Psych: Alert and oriented x3, normal affect and mood.    Assessment & Plan:     65 y.o. male with the following -     1. Thrombocytopenia (HCC)  This is a chronic condition.  The patient has a history of thrombocytopenia last checked 2 years ago.  He denies any issues with blood clotting.  I would like to repeat labs today to ensure stability.  - CBC WITH DIFFERENTIAL; Future  - Comp Metabolic Panel; Future  - Lipid Profile; Future  - VITAMIN D,25 HYDROXY; Future  - TSH WITH REFLEX TO FT4; Future    2. Environmental allergies  This is a chronic condition.  The patient has a history of our mental allergies and generalized urticaria on and off.  He takes antihistamines which relieves his problem.  -Continue to monitor  -Continue with antihistamine    3. Dyslipidemia  This is a chronic condition.  The patient has a history of dyslipidemia.  We will establish labs today.  - CBC WITH DIFFERENTIAL; Future  - Comp Metabolic Panel; Future  - Lipid Profile; Future  - VITAMIN D,25 HYDROXY; " Future    4. Screening for malignant neoplasm of prostate  5. Well adult exam  - CBC WITH DIFFERENTIAL; Future  - Comp Metabolic Panel; Future  - Lipid Profile; Future  - VITAMIN D,25 HYDROXY; Future  - PROSTATE SPECIFIC AG SCREENING; Future  - TSH WITH REFLEX TO FT4; Future      Return in about 6 months (around 1/30/2021).    Please note that this dictation was created using voice recognition software. I have made every reasonable attempt to correct obvious errors, but I expect that there are errors of grammar and possibly content that I did not discover before finalizing the note.

## 2020-07-30 NOTE — ASSESSMENT & PLAN NOTE
This is a chronic issue  The patient has a hx of allergies  He gets body urticaria at times  He takes cetrizine as needed

## 2020-08-03 ENCOUNTER — HOSPITAL ENCOUNTER (OUTPATIENT)
Dept: LAB | Facility: MEDICAL CENTER | Age: 65
End: 2020-08-03
Attending: FAMILY MEDICINE
Payer: COMMERCIAL

## 2020-08-03 DIAGNOSIS — E78.5 DYSLIPIDEMIA: ICD-10-CM

## 2020-08-03 DIAGNOSIS — Z00.00 WELL ADULT EXAM: ICD-10-CM

## 2020-08-03 DIAGNOSIS — Z12.5 SCREENING FOR MALIGNANT NEOPLASM OF PROSTATE: ICD-10-CM

## 2020-08-03 DIAGNOSIS — D69.6 THROMBOCYTOPENIA (HCC): ICD-10-CM

## 2020-08-03 LAB
25(OH)D3 SERPL-MCNC: 39 NG/ML (ref 30–100)
ALBUMIN SERPL BCP-MCNC: 4.5 G/DL (ref 3.2–4.9)
ALBUMIN/GLOB SERPL: 1.7 G/DL
ALP SERPL-CCNC: 57 U/L (ref 30–99)
ALT SERPL-CCNC: 31 U/L (ref 2–50)
ANION GAP SERPL CALC-SCNC: 9 MMOL/L (ref 7–16)
AST SERPL-CCNC: 18 U/L (ref 12–45)
BASOPHILS # BLD AUTO: 0.2 % (ref 0–1.8)
BASOPHILS # BLD: 0.01 K/UL (ref 0–0.12)
BILIRUB SERPL-MCNC: 0.7 MG/DL (ref 0.1–1.5)
BUN SERPL-MCNC: 13 MG/DL (ref 8–22)
CALCIUM SERPL-MCNC: 9.2 MG/DL (ref 8.5–10.5)
CHLORIDE SERPL-SCNC: 103 MMOL/L (ref 96–112)
CHOLEST SERPL-MCNC: 186 MG/DL (ref 100–199)
CO2 SERPL-SCNC: 26 MMOL/L (ref 20–33)
CREAT SERPL-MCNC: 0.96 MG/DL (ref 0.5–1.4)
EOSINOPHIL # BLD AUTO: 0.19 K/UL (ref 0–0.51)
EOSINOPHIL NFR BLD: 4.4 % (ref 0–6.9)
ERYTHROCYTE [DISTWIDTH] IN BLOOD BY AUTOMATED COUNT: 46.4 FL (ref 35.9–50)
FASTING STATUS PATIENT QL REPORTED: NORMAL
GLOBULIN SER CALC-MCNC: 2.6 G/DL (ref 1.9–3.5)
GLUCOSE SERPL-MCNC: 90 MG/DL (ref 65–99)
HCT VFR BLD AUTO: 51.7 % (ref 42–52)
HDLC SERPL-MCNC: 38 MG/DL
HGB BLD-MCNC: 16.9 G/DL (ref 14–18)
IMM GRANULOCYTES # BLD AUTO: 0.01 K/UL (ref 0–0.11)
IMM GRANULOCYTES NFR BLD AUTO: 0.2 % (ref 0–0.9)
LDLC SERPL CALC-MCNC: 121 MG/DL
LYMPHOCYTES # BLD AUTO: 1.36 K/UL (ref 1–4.8)
LYMPHOCYTES NFR BLD: 31.3 % (ref 22–41)
MCH RBC QN AUTO: 31.4 PG (ref 27–33)
MCHC RBC AUTO-ENTMCNC: 32.7 G/DL (ref 33.7–35.3)
MCV RBC AUTO: 95.9 FL (ref 81.4–97.8)
MONOCYTES # BLD AUTO: 0.4 K/UL (ref 0–0.85)
MONOCYTES NFR BLD AUTO: 9.2 % (ref 0–13.4)
NEUTROPHILS # BLD AUTO: 2.38 K/UL (ref 1.82–7.42)
NEUTROPHILS NFR BLD: 54.7 % (ref 44–72)
NRBC # BLD AUTO: 0 K/UL
NRBC BLD-RTO: 0 /100 WBC
PLATELET # BLD AUTO: 111 K/UL (ref 164–446)
PMV BLD AUTO: 12.9 FL (ref 9–12.9)
POTASSIUM SERPL-SCNC: 4.5 MMOL/L (ref 3.6–5.5)
PROT SERPL-MCNC: 7.1 G/DL (ref 6–8.2)
PSA SERPL-MCNC: 1 NG/ML (ref 0–4)
RBC # BLD AUTO: 5.39 M/UL (ref 4.7–6.1)
SODIUM SERPL-SCNC: 138 MMOL/L (ref 135–145)
TRIGL SERPL-MCNC: 134 MG/DL (ref 0–149)
TSH SERPL DL<=0.005 MIU/L-ACNC: 2.28 UIU/ML (ref 0.38–5.33)
WBC # BLD AUTO: 4.4 K/UL (ref 4.8–10.8)

## 2020-08-03 PROCEDURE — 84153 ASSAY OF PSA TOTAL: CPT

## 2020-08-03 PROCEDURE — 84443 ASSAY THYROID STIM HORMONE: CPT

## 2020-08-03 PROCEDURE — 80061 LIPID PANEL: CPT

## 2020-08-03 PROCEDURE — 36415 COLL VENOUS BLD VENIPUNCTURE: CPT

## 2020-08-03 PROCEDURE — 80053 COMPREHEN METABOLIC PANEL: CPT

## 2020-08-03 PROCEDURE — 85025 COMPLETE CBC W/AUTO DIFF WBC: CPT

## 2020-08-03 PROCEDURE — 82306 VITAMIN D 25 HYDROXY: CPT

## 2021-01-28 ENCOUNTER — TELEPHONE (OUTPATIENT)
Dept: MEDICAL GROUP | Facility: PHYSICIAN GROUP | Age: 66
End: 2021-01-28

## 2021-01-28 NOTE — TELEPHONE ENCOUNTER
ESTABLISHED PATIENT PRE-VISIT PLANNING     Patient was NOT contacted to complete PVP.     Note: Patient will not be contacted if there is no indication to call.     1.  Reviewed notes from the last few office visits within the medical group: Yes    2.  If any orders were placed at last visit or intended to be done for this visit (i.e. 6 mos follow-up), do we have Results/Consult Notes?         •  Labs - Labs ordered, completed on 08/03/2020 and results are in chart.  Note: If patient appointment is for lab review and patient did not complete labs, check with provider if OK to reschedule patient until labs completed.       •  Imaging - Imaging was not ordered at last office visit.       •  Referrals - No referrals were ordered at last office visit.    3. Is this appointment scheduled as a Hospital Follow-Up? No    4.  Immunizations were updated in Epic using Reconcile Outside Information activity? Yes    5.  Patient is due for the following Health Maintenance Topics:   Health Maintenance Due   Topic Date Due   • HEPATITIS C SCREENING  1955   • IMM ZOSTER VACCINES (2 of 3) 07/13/2015   • IMM PNEUMOCOCCAL VACCINE: 65+ Years (1 of 1 - PPSV23) 04/05/2020       - Patient is up-to-date on all Health Maintenance topics. No records have been requested at this time.    6.  AHA (Pulse8) form printed for Provider? N/A

## 2021-02-01 ENCOUNTER — OFFICE VISIT (OUTPATIENT)
Dept: MEDICAL GROUP | Facility: PHYSICIAN GROUP | Age: 66
End: 2021-02-01
Payer: COMMERCIAL

## 2021-02-01 VITALS
TEMPERATURE: 98.1 F | WEIGHT: 146.6 LBS | HEART RATE: 72 BPM | BODY MASS INDEX: 23.56 KG/M2 | DIASTOLIC BLOOD PRESSURE: 64 MMHG | HEIGHT: 66 IN | RESPIRATION RATE: 16 BRPM | OXYGEN SATURATION: 98 % | SYSTOLIC BLOOD PRESSURE: 118 MMHG

## 2021-02-01 DIAGNOSIS — D69.6 THROMBOCYTOPENIA (HCC): ICD-10-CM

## 2021-02-01 DIAGNOSIS — M43.6 NECK STIFFNESS: ICD-10-CM

## 2021-02-01 DIAGNOSIS — E78.5 DYSLIPIDEMIA: ICD-10-CM

## 2021-02-01 PROCEDURE — 99214 OFFICE O/P EST MOD 30 MIN: CPT | Performed by: FAMILY MEDICINE

## 2021-02-01 ASSESSMENT — FIBROSIS 4 INDEX: FIB4 SCORE: 1.89

## 2021-02-01 NOTE — ASSESSMENT & PLAN NOTE
This is a chronic, stable condition  The patient completed a lipid panel August 2020 with stable values  The 10-year ASCVD risk score (Idaliakelvin MCHUGH Jr., et al., 2013) is: 12.4%  Currently not on statin therapy

## 2021-02-01 NOTE — ASSESSMENT & PLAN NOTE
This is a chronic issue.  Repeatedly having a low platelet count last checked August 2020 at 111.  Denies any major bleeding or clotting issues.  He is not on any medications that could alter platelet count.

## 2021-02-01 NOTE — ASSESSMENT & PLAN NOTE
This is a new problem  The patient has had a little neck pain and stiffness on/off  Especially at the end of day  Denies any vision changes  Denies any headaches, fever or chills

## 2021-02-01 NOTE — PROGRESS NOTES
Subjective:     Chief Complaint   Patient presents with   • Follow-Up       HPI:   Edgar presents today to discuss the following.    Dyslipidemia  This is a chronic, stable condition  The patient completed a lipid panel August 2020 with stable values  The 10-year ASCVD risk score ([place] DC Jr., et al., 2013) is: 12.4%  Currently not on statin therapy    Thrombocytopenia (HCC)  This is a chronic issue.  Repeatedly having a low platelet count last checked August 2020 at 111.  Denies any major bleeding or clotting issues.  He is not on any medications that could alter platelet count.    Neck stiffness  This is a new problem  The patient has had a little neck pain and stiffness on/off  Especially at the end of day  Denies any vision changes  Denies any headaches, fever or chills      Past Medical History:   Diagnosis Date   • Allergy    • Basal cell carcinoma 2019   • Chronic urticaria 1/19/2014   • Environmental and seasonal allergies 2012   • Facial basal cell cancer    • Hyperlipidemia    • Retinitis pigmentosa 3/1/2010       Social History     Tobacco Use   • Smoking status: Never Smoker   • Smokeless tobacco: Never Used   Substance Use Topics   • Alcohol use: Yes     Alcohol/week: 4.2 oz     Types: 5 Glasses of wine, 2 Standard drinks or equivalent per week     Frequency: 2-3 times a week     Drinks per session: 1 or 2   • Drug use: No       Current Outpatient Medications Ordered in Epic   Medication Sig Dispense Refill   • sildenafil citrate (VIAGRA) 50 MG tablet Take 1 Tab by mouth as needed for Erectile Dysfunction. 10 Tab 3   • VITAMIN D, CHOLECALCIFEROL, PO Take  by mouth.     • aspirin (ASA) 325 MG Tab Take 81 mg by mouth every day.     • Omega-3 Fatty Acids (FISH OIL) 1000 MG Cap capsule Take 1,000 mg by mouth 3 times a day, with meals.     • Multiple Vitamins-Minerals (EYE VITAMINS PO) Take  by mouth.     • Ibuprofen (ADVIL PO) Take  by mouth.     • Cetirizine HCl (ZYRTEC PO) Take  by mouth.       No  "current [facility]-ordered facility-administered medications on file.        Allergies:  Patient has no known allergies.    Health Maintenance: Completed    ROS:  Gen: no fevers/chills, no changes in weight  Eyes: no changes in vision  ENT: no sore throat, no hearing loss, no bloody nose  Pulm: no sob, no cough  CV: no chest pain, no palpitations      Objective:     Exam:  /64 (BP Location: Left arm, Patient Position: Sitting, BP Cuff Size: Adult)   Pulse 72   Temp 36.7 °C (98.1 °F) (Temporal)   Resp 16   Ht 1.676 m (5' 6\")   Wt 66.5 kg (146 lb 9.6 oz)   SpO2 98%   BMI 23.66 kg/m²  Body mass index is 23.66 kg/m².    Constitutional: Alert, no distress, well-groomed.  Skin: Warm, dry, good turgor, no rashes in visible areas.  Eye: Equal, round and reactive, conjunctiva clear, lids normal.  ENMT: Lips without lesions, good dentition, moist mucous membranes.  Neck: Trachea midline, no masses, no thyromegaly.  Respiratory: Unlabored respiratory effort, no cough.  MSK: Normal gait, moves all extremities.  Neuro: Grossly non-focal.   Psych: Alert and oriented x3, normal affect and mood.        Assessment & Plan:     65 y.o. male with the following -     1. Dyslipidemia  Chronic, stable condition.  ASCVD score is 12%.  He declines statin therapy.  We will repeat labs by August 2021.  - Lipid Profile; Future    2. Thrombocytopenia (HCC)  Chronic, stable condition.  Denies any acute bleeding at this time.    3. Neck stiffness  This is a new problem.  The patient has been experiencing neck stiffness likely due to osteoarthritis.  Denies any neurological symptoms at this time.  I offered x-rays but he would like to hold off on this for now.      Return in about 6 months (around 8/1/2021).    Please note that this dictation was created using voice recognition software. I have made every reasonable attempt to correct obvious errors, but I expect that there are errors of grammar and possibly content that I did not discover " before finalizing the note.

## 2021-03-03 DIAGNOSIS — Z23 NEED FOR VACCINATION: ICD-10-CM

## 2023-08-14 ENCOUNTER — TELEPHONE (OUTPATIENT)
Dept: HEALTH INFORMATION MANAGEMENT | Facility: OTHER | Age: 68
End: 2023-08-14

## 2023-12-15 ENCOUNTER — APPOINTMENT (OUTPATIENT)
Dept: RADIOLOGY | Facility: IMAGING CENTER | Age: 68
End: 2023-12-15
Attending: NURSE PRACTITIONER
Payer: COMMERCIAL

## 2023-12-15 ENCOUNTER — OFFICE VISIT (OUTPATIENT)
Dept: URGENT CARE | Facility: CLINIC | Age: 68
End: 2023-12-15
Payer: COMMERCIAL

## 2023-12-15 VITALS
WEIGHT: 142 LBS | RESPIRATION RATE: 18 BRPM | BODY MASS INDEX: 22.82 KG/M2 | TEMPERATURE: 99 F | DIASTOLIC BLOOD PRESSURE: 96 MMHG | HEIGHT: 66 IN | SYSTOLIC BLOOD PRESSURE: 144 MMHG | OXYGEN SATURATION: 94 % | HEART RATE: 114 BPM

## 2023-12-15 DIAGNOSIS — J02.9 SORE THROAT: ICD-10-CM

## 2023-12-15 DIAGNOSIS — U07.1 COVID-19: ICD-10-CM

## 2023-12-15 LAB
FLUAV RNA SPEC QL NAA+PROBE: NEGATIVE
FLUBV RNA SPEC QL NAA+PROBE: NEGATIVE
RSV RNA SPEC QL NAA+PROBE: NEGATIVE
S PYO DNA SPEC NAA+PROBE: NOT DETECTED
SARS-COV-2 RNA RESP QL NAA+PROBE: POSITIVE

## 2023-12-15 PROCEDURE — 0241U POCT CEPHEID COV-2, FLU A/B, RSV - PCR: CPT | Performed by: NURSE PRACTITIONER

## 2023-12-15 PROCEDURE — 3080F DIAST BP >= 90 MM HG: CPT | Performed by: NURSE PRACTITIONER

## 2023-12-15 PROCEDURE — 87651 STREP A DNA AMP PROBE: CPT | Performed by: NURSE PRACTITIONER

## 2023-12-15 PROCEDURE — 70360 X-RAY EXAM OF NECK: CPT | Mod: TC | Performed by: NURSE PRACTITIONER

## 2023-12-15 PROCEDURE — 99214 OFFICE O/P EST MOD 30 MIN: CPT | Performed by: NURSE PRACTITIONER

## 2023-12-15 PROCEDURE — 3077F SYST BP >= 140 MM HG: CPT | Performed by: NURSE PRACTITIONER

## 2023-12-15 RX ORDER — LIDOCAINE HYDROCHLORIDE 20 MG/ML
5 SOLUTION OROPHARYNGEAL 4 TIMES DAILY PRN
Qty: 120 ML | Refills: 0 | Status: SHIPPED | OUTPATIENT
Start: 2023-12-15

## 2023-12-15 RX ORDER — DEXAMETHASONE SODIUM PHOSPHATE 10 MG/ML
10 INJECTION INTRAMUSCULAR; INTRAVENOUS ONCE
Status: COMPLETED | OUTPATIENT
Start: 2023-12-15 | End: 2023-12-15

## 2023-12-15 RX ADMIN — DEXAMETHASONE SODIUM PHOSPHATE 10 MG: 10 INJECTION INTRAMUSCULAR; INTRAVENOUS at 15:12

## 2023-12-15 NOTE — PROGRESS NOTES
Chief Complaint   Patient presents with    Sore Throat       HISTORY OF PRESENT ILLNESS: Patient is a pleasant 68 y.o. male who presents today due to complaints of a sore throat for the past three days. Reports associated tactile fever, increased saliva, chills, pain with swallowing. Pain is moderate. Denies associated rash, chest pain, urinary complaints, congestion, cough, or difficulty breathing. He has tried OTC medications at home without much improvement. Denies  known ill contacts.       Patient Active Problem List    Diagnosis Date Noted    Neck stiffness 2021    Dyslipidemia 2019    Hypovitaminosis D 2019    Facial basal cell cancer 2019    Thrombocytopenia (HCC) 2019    Environmental allergies 2019    Leukopenia 2019    Erectile dysfunction 2019    Healthcare maintenance 2019    Vitiligo 2015    Hypophosphatemia 2015    Chronic urticaria 2014    Anxiety 2013       Allergies:Patient has no known allergies.    No current Epic-ordered outpatient medications on file.     No current Epic-ordered facility-administered medications on file.       Past Medical History:   Diagnosis Date    Allergy     Basal cell carcinoma 2019    Chronic urticaria 2014    Environmental and seasonal allergies     Facial basal cell cancer     Hyperlipidemia     Retinitis pigmentosa 3/1/2010       Social History     Tobacco Use    Smoking status: Never    Smokeless tobacco: Never   Vaping Use    Vaping Use: Former    Quit date: 1987   Substance Use Topics    Alcohol use: Yes     Alcohol/week: 4.2 oz     Types: 5 Glasses of wine, 2 Standard drinks or equivalent per week    Drug use: No       Family Status   Relation Name Status    Mo      Fa      Bro  Alive    Bro  Alive     Family History   Problem Relation Age of Onset    Heart Disease Father     Hyperlipidemia Father        ROS:  Review of Systems   Constitutional: Positive for  "fever, chills. Negative for weight loss, malaise, and fatigue.   HENT: Positive for sore throat. Negative for ear pain, nosebleeds, congestion.   Eyes: Negative for vision changes.   Cardiovascular: Negative for chest pain, palpitations, orthopnea and leg swelling.   Respiratory: Negative for cough, sputum production, shortness of breath and wheezing.   Gastrointestinal: Negative for abdominal pain, nausea, vomiting or diarrhea.   : Negative for changes in urination.   Skin: Negative for rash, diaphoresis.     Exam:  BP (!) 144/96 (BP Location: Left arm, Patient Position: Sitting, BP Cuff Size: Adult)   Pulse (!) 114   Temp 37.2 °C (99 °F) (Temporal)   Resp 18   Ht 1.676 m (5' 6\")   Wt 64.4 kg (142 lb)   SpO2 94%   General: well-nourished, well-developed male in NAD  Head: normocephalic, atraumatic  Eyes: PERRLA, no conjunctival injection, acuity grossly intact, lids normal.  Ears: normal shape and symmetry, no tenderness, no discharge. External canals are without any significant edema or erythema. Tympanic membranes are without any inflammation, no effusion. Gross auditory acuity is intact.  Nose: symmetrical without tenderness, no discharge.  Mouth/Throat: reasonable hygiene. There is erythema, swollen uvula, without exudates and tonsillar enlargement present. Airway appears patent, no drooling.   Neck: no masses, range of motion within normal limits, no tracheal deviation. No obvious thyroid enlargement.   Lymph: bilateral anterior cervical adenopathy. No supraclavicular adenopathy.   Neuro: alert and oriented. Cranial nerves 1-12 grossly intact. No sensory deficit.   Cardiovascular: regular rate and rhythm. No edema.  Pulmonary: no distress. Chest is symmetrical with respiration, no wheezes, crackles, or rhonchi.   Musculoskeletal: no clubbing, appropriate muscle tone, gait is stable.  Skin: warm, dry, intact, no clubbing, no cyanosis, no rashes.   Psych: appropriate mood, affect, judgement.       POC " "strep negative    POC COVID positive        DX neck radiology reading \"Unremarkable neck soft tissue radiographs. \"        Assessment/Plan:  1. COVID-19  Nirmatrelvir&Ritonavir 300/100 20 x 150 MG & 10 x 100MG Tablet Therapy Pack      2. Sore throat  POCT GROUP A STREP, PCR    POCT CoV-2, Flu A/B, RSV by PCR    DX-NECK FOR SOFT TISSUE    lidocaine (XYLOCAINE) 2 % Solution    dexamethasone (Decadron) injection (check route below) 10 mg                  Patient presents with sore throat, increased saliva, malaise, fatigue.  Airway patent without signs of distress.  Strep test negative.  Decadron given in clinic. Soft tissue x-ray negative.  Positive COVID test in clinic.  Paxlovid provided.  OTC motrin or tylenol for pain/fever control. Hand hygiene. Increase fluid intake, rest. Warm salt water gargles.  Home isolation encouraged.  Supportive care, differential diagnoses, and indications for immediate follow-up discussed with patient.   Pathogenesis of diagnosis discussed including typical length and natural progression.   Instructed to return to clinic or nearest emergency department for any change in condition, further concerns, or worsening of symptoms.  Patient states understanding of the plan of care and discharge instructions.  Instructed to make an appointment, for follow up, with his primary care provider.        Please note that this dictation was created using voice recognition software. I have made every reasonable attempt to correct obvious errors, but I expect that there are errors of grammar and possibly content that I did not discover before finalizing the note.       Ayla Gonzales A.P.R.N. ;  "

## 2025-01-15 ENCOUNTER — HOSPITAL ENCOUNTER (OUTPATIENT)
Dept: LAB | Facility: MEDICAL CENTER | Age: 70
End: 2025-01-15
Attending: FAMILY MEDICINE
Payer: COMMERCIAL

## 2025-01-15 LAB
ALBUMIN SERPL BCP-MCNC: 4.7 G/DL (ref 3.2–4.9)
ALBUMIN/GLOB SERPL: 1.7 G/DL
ALP SERPL-CCNC: 68 U/L (ref 30–99)
ALT SERPL-CCNC: 44 U/L (ref 2–50)
ANION GAP SERPL CALC-SCNC: 11 MMOL/L (ref 7–16)
AST SERPL-CCNC: 29 U/L (ref 12–45)
BASOPHILS # BLD AUTO: 0.5 % (ref 0–1.8)
BASOPHILS # BLD: 0.02 K/UL (ref 0–0.12)
BILIRUB SERPL-MCNC: 0.8 MG/DL (ref 0.1–1.5)
BUN SERPL-MCNC: 10 MG/DL (ref 8–22)
CALCIUM ALBUM COR SERPL-MCNC: 8.6 MG/DL (ref 8.5–10.5)
CALCIUM SERPL-MCNC: 9.2 MG/DL (ref 8.5–10.5)
CHLORIDE SERPL-SCNC: 102 MMOL/L (ref 96–112)
CHOLEST SERPL-MCNC: 221 MG/DL (ref 100–199)
CO2 SERPL-SCNC: 25 MMOL/L (ref 20–33)
CREAT SERPL-MCNC: 0.88 MG/DL (ref 0.5–1.4)
EOSINOPHIL # BLD AUTO: 0.16 K/UL (ref 0–0.51)
EOSINOPHIL NFR BLD: 4 % (ref 0–6.9)
ERYTHROCYTE [DISTWIDTH] IN BLOOD BY AUTOMATED COUNT: 47.3 FL (ref 35.9–50)
GFR SERPLBLD CREATININE-BSD FMLA CKD-EPI: 93 ML/MIN/1.73 M 2
GLOBULIN SER CALC-MCNC: 2.7 G/DL (ref 1.9–3.5)
GLUCOSE SERPL-MCNC: 88 MG/DL (ref 65–99)
HCT VFR BLD AUTO: 53.4 % (ref 42–52)
HDLC SERPL-MCNC: 49 MG/DL
HGB BLD-MCNC: 17.9 G/DL (ref 14–18)
IMM GRANULOCYTES # BLD AUTO: 0.01 K/UL (ref 0–0.11)
IMM GRANULOCYTES NFR BLD AUTO: 0.3 % (ref 0–0.9)
LDLC SERPL CALC-MCNC: 136 MG/DL
LYMPHOCYTES # BLD AUTO: 1.22 K/UL (ref 1–4.8)
LYMPHOCYTES NFR BLD: 30.7 % (ref 22–41)
MCH RBC QN AUTO: 30.9 PG (ref 27–33)
MCHC RBC AUTO-ENTMCNC: 33.5 G/DL (ref 32.3–36.5)
MCV RBC AUTO: 92.2 FL (ref 81.4–97.8)
MONOCYTES # BLD AUTO: 0.35 K/UL (ref 0–0.85)
MONOCYTES NFR BLD AUTO: 8.8 % (ref 0–13.4)
NEUTROPHILS # BLD AUTO: 2.21 K/UL (ref 1.82–7.42)
NEUTROPHILS NFR BLD: 55.7 % (ref 44–72)
NRBC # BLD AUTO: 0 K/UL
NRBC BLD-RTO: 0 /100 WBC (ref 0–0.2)
PLATELET # BLD AUTO: 102 K/UL (ref 164–446)
PMV BLD AUTO: 12.4 FL (ref 9–12.9)
POTASSIUM SERPL-SCNC: 4.3 MMOL/L (ref 3.6–5.5)
PROT SERPL-MCNC: 7.4 G/DL (ref 6–8.2)
PSA SERPL DL<=0.01 NG/ML-MCNC: 1.59 NG/ML (ref 0–4)
RBC # BLD AUTO: 5.79 M/UL (ref 4.7–6.1)
SODIUM SERPL-SCNC: 138 MMOL/L (ref 135–145)
TRIGL SERPL-MCNC: 178 MG/DL (ref 0–149)
WBC # BLD AUTO: 4 K/UL (ref 4.8–10.8)

## 2025-01-15 PROCEDURE — 80053 COMPREHEN METABOLIC PANEL: CPT

## 2025-01-15 PROCEDURE — 36415 COLL VENOUS BLD VENIPUNCTURE: CPT

## 2025-01-15 PROCEDURE — 80061 LIPID PANEL: CPT

## 2025-01-15 PROCEDURE — 85025 COMPLETE CBC W/AUTO DIFF WBC: CPT

## 2025-01-15 PROCEDURE — 84153 ASSAY OF PSA TOTAL: CPT
